# Patient Record
(demographics unavailable — no encounter records)

---

## 2025-07-30 NOTE — HISTORY OF PRESENT ILLNESS
[Improved Health] : Improved health [Improve Mood] : Improved mood [Exercise: ____] : exercise: [unfilled] [Prescription Medications: _____] : prescription medications: [unfilled] [Poor eating habits] : poor eating habits [I usually sleep 6-8 hours] : I usually sleep 6-8 hours [2+ miles] : Walking distance capability: 2+ miles [Home] : at home, [unfilled] , at the time of the visit. [Medical Office: ([facility])___] : at the medical office located in  [Telehealth (audio & video)] : This visit was provided via telehealth using real-time 2-way audio visual technology. [Verbal consent obtained from patient] : the patient, [unfilled] [FreeTextEntry2] : 220 [FreeTextEntry3] : 245 [5] : 5 [Every night] : I use a CPAP machine every night [] : No [FreeTextEntry1] : 37-year-old male here for weight management initial visit. patient goes to  3 days a week and goes to the gym as well patient can't get passed 220lbs. 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 patient states he tried ozempic in the past and did not do well. patient states he had horrible nausea and vomiting. patient states he was not eating the best foods at the time. patient states he feels he has a large appetite can eat large portions.    start Zepbound 2.5mg Q weekly     -No known or reported history of pancreatitis/ personal or family hx of known medullary     thyroid cancer   -advised on GI effects including but not limited to nausea, vomiting, diarrhea with     potential for gallbladder disease and pancreatitis, currently associated with GLP-1RA     therapy     -Advised if any new or worsening Nausea/vomiting/ abdominal pain or new     swelling/lump in neck to stop GLP1RA and to seek urgent medical attention, as this can     be a sign of a potentially serious side effect.     -Advised on risk of acute kidney injury and advised po hydration.  Patient advised that lifestyle therapy including a healthy meal plan, physical activity and     behavioral intervention is the foundation of obesity treatment, and advised to incorporate     the following:  - Nutritional consultation      -advised on increasing exercise as tolerated eg ~30% increase in steps/day or at least     150min/week physical exercise divided most days of the week.      -advised on use of supportive apps for calorie counting and weight loss     -Advised on self-monitoring, stress management and keeping a log of food/beverage     intake and physical activity

## 2025-07-30 NOTE — HISTORY OF PRESENT ILLNESS
[Improved Health] : Improved health [Improve Mood] : Improved mood [Exercise: ____] : exercise: [unfilled] [Prescription Medications: _____] : prescription medications: [unfilled] [Poor eating habits] : poor eating habits [I usually sleep 6-8 hours] : I usually sleep 6-8 hours [2+ miles] : Walking distance capability: 2+ miles [Home] : at home, [unfilled] , at the time of the visit. [Medical Office: ([facility])___] : at the medical office located in  [Telehealth (audio & video)] : This visit was provided via telehealth using real-time 2-way audio visual technology. [Verbal consent obtained from patient] : the patient, [unfilled] [FreeTextEntry2] : 220 [FreeTextEntry3] : 245 [5] : 5 [Every night] : I use a CPAP machine every night [] : No [FreeTextEntry1] : 37-year-old male here for weight management initial visit. patient goes to  3 days a week and goes to the gym as well patient can't get passed 220lbs. 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 patient states he tried ozempic in the past and did not do well. patient states he had horrible nausea and vomiting. patient states he was not eating the best foods at the time. patient states he feels he has a large appetite can eat large portions.    start Zepbound 2.5mg Q weekly     -No known or reported history of pancreatitis/ personal or family hx of known medullary     thyroid cancer   -advised on GI effects including but not limited to nausea, vomiting, diarrhea with     potential for gallbladder disease and pancreatitis, currently associated with GLP-1RA     therapy     -Advised if any new or worsening Nausea/vomiting/ abdominal pain or new     swelling/lump in neck to stop GLP1RA and to seek urgent medical attention, as this can     be a sign of a potentially serious side effect.     -Advised on risk of acute kidney injury and advised po hydration.  Patient advised that lifestyle therapy including a healthy meal plan, physical activity and     behavioral intervention is the foundation of obesity treatment, and advised to incorporate     the following:  - Nutritional consultation      -advised on increasing exercise as tolerated eg ~30% increase in steps/day or at least     150min/week physical exercise divided most days of the week.      -advised on use of supportive apps for calorie counting and weight loss     -Advised on self-monitoring, stress management and keeping a log of food/beverage     intake and physical activity